# Patient Record
Sex: MALE | Employment: FULL TIME | URBAN - METROPOLITAN AREA
[De-identification: names, ages, dates, MRNs, and addresses within clinical notes are randomized per-mention and may not be internally consistent; named-entity substitution may affect disease eponyms.]

---

## 2017-02-08 ENCOUNTER — OFFICE VISIT (OUTPATIENT)
Dept: CARDIOLOGY | Facility: CLINIC | Age: 62
End: 2017-02-08

## 2017-02-08 VITALS
SYSTOLIC BLOOD PRESSURE: 114 MMHG | HEART RATE: 62 BPM | DIASTOLIC BLOOD PRESSURE: 78 MMHG | HEIGHT: 74 IN | WEIGHT: 255 LBS | BODY MASS INDEX: 32.73 KG/M2

## 2017-02-08 DIAGNOSIS — R26.89 IMBALANCE: ICD-10-CM

## 2017-02-08 DIAGNOSIS — I48.91 ATRIAL FIBRILLATION, UNSPECIFIED TYPE (HCC): Primary | ICD-10-CM

## 2017-02-08 DIAGNOSIS — I48.0 PAROXYSMAL ATRIAL FIBRILLATION (HCC): ICD-10-CM

## 2017-02-08 DIAGNOSIS — R53.82 CHRONIC FATIGUE: ICD-10-CM

## 2017-02-08 PROCEDURE — 93000 ELECTROCARDIOGRAM COMPLETE: CPT | Performed by: INTERNAL MEDICINE

## 2017-02-08 PROCEDURE — 99204 OFFICE O/P NEW MOD 45 MIN: CPT | Performed by: INTERNAL MEDICINE

## 2017-02-08 RX ORDER — TADALAFIL 5 MG
TABLET ORAL
Refills: 3 | COMMUNITY
Start: 2017-01-27

## 2017-02-08 RX ORDER — ASPIRIN 81 MG/1
81 TABLET ORAL DAILY
COMMUNITY

## 2017-02-08 RX ORDER — MELOXICAM 15 MG/1
15 TABLET ORAL DAILY
COMMUNITY

## 2017-02-08 RX ORDER — DOXAZOSIN MESYLATE 4 MG/1
4 TABLET ORAL NIGHTLY
COMMUNITY

## 2017-02-08 RX ORDER — RIVAROXABAN 20 MG/1
TABLET, FILM COATED ORAL
Refills: 0 | COMMUNITY
Start: 2017-01-31

## 2017-02-08 RX ORDER — ASCORBIC ACID 500 MG
1000 TABLET ORAL DAILY
COMMUNITY

## 2017-02-08 RX ORDER — AMIODARONE HYDROCHLORIDE 200 MG/1
TABLET ORAL
Refills: 2 | COMMUNITY
Start: 2017-01-31

## 2017-02-08 RX ORDER — LISINOPRIL 2.5 MG/1
TABLET ORAL
Refills: 2 | COMMUNITY
Start: 2017-01-31

## 2017-02-08 RX ORDER — OXYBUTYNIN CHLORIDE 5 MG/1
5 TABLET ORAL 3 TIMES DAILY
COMMUNITY

## 2017-02-08 NOTE — PROGRESS NOTES
[facility]  Cardiology Office Visit Note        Subjective:     Encounter Date:2017      Patient ID: Mahesh Abad   Age: 61 y.o.  Sex: male  :  1955  MRN: [id number]             Date of Office Visit: 2017  Encounter Provider: Isidro Ken MD  Place of Service: [facility]     .    Chief Complaint:  History of Present Illness    The following portions of the patient's history were reviewed and updated as appropriate: allergies, current medications, past family history, past medical history, past social history, past surgical history and problem list.    Review of Systems   Constitution: Positive for weakness, malaise/fatigue and weight gain.   HENT: Negative for congestion, headaches, hearing loss and sore throat.    Eyes: Negative for blurred vision.   Cardiovascular: Negative for chest pain, claudication, cyanosis, dyspnea on exertion, irregular heartbeat, leg swelling, near-syncope, orthopnea, palpitations, paroxysmal nocturnal dyspnea and syncope.   Respiratory: Negative for cough, shortness of breath and snoring.    Endocrine: Negative for cold intolerance.   Hematologic/Lymphatic: Negative for adenopathy. Does not bruise/bleed easily.   Skin: Negative for rash.   Musculoskeletal: Negative for back pain.   Gastrointestinal: Negative for abdominal pain, change in bowel habit, constipation and diarrhea.   Genitourinary: Negative for dysuria, frequency, hematuria and hesitancy.   Neurological: Positive for disturbances in coordination and loss of balance. Negative for excessive daytime sleepiness, dizziness and focal weakness.   Psychiatric/Behavioral: Negative for memory loss. The patient is not nervous/anxious.    Allergic/Immunologic: Negative for hives.   All other systems reviewed and are negative.        ECG 12 Lead  Date/Time: 2017 1:03 PM  Performed by: ISIDRO KEN JR  Authorized by: ISIDRO KEN JR    Comparison: not compared with previous ECG   Previous ECG: no previous ECG available  Rhythm: sinus bradycardia  BPM: 45  Conduction comments: Left axis deviation  Other findings: PRWP  Clinical impression: low voltage                       HPI     The patient is a 61-year-old white male from [facility] with history of new onset atrial fibrillation in late December 2016 now on amiodarone and Xarelto, who presents for a second opinion on how to treat his paroxysmal atrial fibrillation.  He ceased at 4 several weeks, he felt very fatigued and off balance.  No chest pain dizziness shortness of breath or palpitations.  No syncope.  Went to his doctor because of the fatigue and was diagnosed with atrial fibrillation apparently.  He was admitted to [facility] in December 30, 2016 and stated there until January 3, 2017.  During that admission, he underwent cardioversion without effect.  However, after treadmill test, his heart went back into sinus rhythm and he felt much better immediately.  At that point he was put on amiodarone which she continues to take.  This stress test however was abnormal.  The schedule him for an outpatient cardiac catheterization which will occur next week.  He also saw an electrophysiologist during that admission who talked him a lot ablation.       since discharge from [facility], he's had 4 episodes of what he thinks his atrial fibrillation because he will become weak and tired.  3 of the episodes lasted around 4 hours long, but one episode lasted 12 hours long.    Cardiac review of systems: No chest pain.  No PND, orthopnea or pedal edema.  No recent change in weight.  He quit smoking when he was admitted to [facility].  He is not keen weight after having stopped smoking.    He plays golf and walks for exercise.    He had an echocardiogram and was told that his ejection fraction was 45%.  I do not have records of that [facility] admission.    Cardiac risk factors: No  "diabetes, hypertension or hyperlipidemia.  No family history of early CAD as father had a stent at age 75.  He does not smoke since January of this year          No past medical history on file.    Past Surgical History   Procedure Laterality Date   • Gallbladder surgery     • Tonsilectomy, adenoidectomy, bilateral myringotomy and tubes         Social History     Social History   • Marital status:      Spouse name: N/A   • Number of children: N/A   • Years of education: N/A     Occupational History   • Not on file.     Social History Main Topics   • Smoking status: Former Smoker   • Smokeless tobacco: Not on file   • Alcohol use Yes   • Drug use: Not on file   • Sexual activity: Not on file     Other Topics Concern   • Not on file     Social History Narrative   • No narrative on file       Family History   Problem Relation Age of Onset   • Heart disease Father            Scheduled Meds:  No current outpatient prescriptions on file prior to visit.     No current facility-administered medications on file prior to visit.        Visit Vitals   • /78   • Pulse 62   • Ht 74\" (188 cm)   • Wt 255 lb (116 kg)   • BMI 32.74 kg/m2       Objective:     Physical Exam   Constitutional: He is oriented to person, place, and time. He appears well-developed and well-nourished. No distress.   HENT:   Head: Normocephalic and atraumatic.   Right Ear: External ear normal.   Left Ear: External ear normal.   Mouth/Throat: Oropharynx is clear and moist. No oropharyngeal exudate.   Eyes: Conjunctivae and EOM are normal. Pupils are equal, round, and reactive to light. No scleral icterus.   Neck: Normal range of motion. Neck supple. No JVD present. No tracheal deviation present. No thyromegaly present.   Cardiovascular: Regular rhythm, S1 normal, S2 normal, normal heart sounds and intact distal pulses.  Bradycardia present.  PMI is not displaced.  Exam reveals no gallop, no distant heart sounds, no friction rub and no decreased " pulses.    No murmur heard.  Pulmonary/Chest: Effort normal and breath sounds normal. No accessory muscle usage. No respiratory distress. He has no wheezes. He has no rales. He exhibits no tenderness.   Abdominal: Soft. Bowel sounds are normal. He exhibits no distension and no mass. There is no tenderness. There is no rebound and no guarding.   Musculoskeletal: Normal range of motion. He exhibits no edema, tenderness or deformity.   Lymphadenopathy:     He has no cervical adenopathy.   Neurological: He is alert and oriented to person, place, and time. He has normal reflexes. No cranial nerve deficit. Coordination normal.   Skin: Skin is dry. No rash noted. He is not diaphoretic. No erythema. No pallor.   Psychiatric: He has a normal mood and affect.             Lab Review:               Lab Review:         Lab Review     No results found for: CHOL  No results found for: HDL  No results found for: LDL  No results found for: TRIG  No components found for: CHOLHDL  No results found for: GLUCOSE, BUN, CREATININE, EGFRIFNONA, EGFRIFAFRI, BCR, CO2, CALCIUM, PROTENTOTREF, ALBUMIN, LABIL2, AST, ALT  No results found for: GLUCOSE, CALCIUM, NA, K, CO2, CL, BUN, CREATININE, EGFRIFAFRI, EGFRIFNONA, BCR, ANIONGAP  No results found for: WBC, HGB, HCT, MCV, PLT  No results found for: DDIMER  No results found for: TSH, S8ZQWMK, I6XHVJK, THYROIDAB  No results found for: CKTOTAL  No results found for: DIGOXIN  No results found for: CKTOTAL, CKMB, CKMBINDEX, TROPONINI, TROPONINT  No results found for: INR, PROTIME  CrCl cannot be calculated (Patient has no serum creatinine result on file.).    Assessment:          Diagnosis Plan   1. Atrial fibrillation, unspecified type  ECG 12 Lead   2. Paroxysmal atrial fibrillation     3. Chronic fatigue     4. Imbalance            Assessment and Plan:    Mahesh was seen today for atrial fibrillation.    Diagnoses and all orders for this visit:    Atrial fibrillation, unspecified type  -     ECG  12 Lead    Paroxysmal atrial fibrillation    Chronic fatigue    Imbalance    The patient is a 61-year-old male who presents for second opinion about treatment of his atrial fibrillation.  He is in sinus bradycardia right now rate 49 with PAC.  QT interval is 448 ms corrected.  He is on amiodarone 200 mg by mouth twice a day.  I will not change his dose as he is been loaded with amiodarone still.  I told him he needs to follow-up with his cardiologist [facility] that undergo cardiac catheterization.  The patient has no significant coronary artery disease then he could be switched to a different antiarrhythmic agent.  If he has coronary artery disease, then they can fix it and then later be switched to a another medication.  He is concerned about taking amiodarone long-term because of the side effects.  I told him that it is a good drug for now and stay on it.    His chads 2 score is 0.  I agree with Xarelto.  However, if he does not have prolonged episodes of atrial fibrillation over 24 hours, then could possibly come off the Xarelto.  Better yet, the patient may need to undergo ablation given that he is having breakthrough atrial fibrillation several times since discharge while on amiodarone, the best antiarrhythmic agent.  He will speak to his electrophysiologist about that.    He likely needs to be tested for sleep apnea because of his large body habitus.  Sleep apnea is a major risk factor for atrial fibrillation.      A total of 25 minutes was spent in the care of this patient, including at least 13 minutes face-to-face with the patient.    I not only counseled the patient today on the significant factors noted in the assessment and plan, but I also recommended that the patient reduce salt and saturated animal fat intake in diet, as well as to perform scheduled exercise on a regular basis.      Plan:                  02/08/2017  2:30 PM  MD Gabriel Gutierrez MD  2/8/2017, 2:30 PM    EMR  Dragon/Transcription disclaimer:   Much of this encounter note is an electronic transcription/translation of spoken language to printed text. The electronic translation of spoken language may permit erroneous, or at times, nonsensical words or phrases to be inadvertently transcribed; Although I have reviewed the note for such errors, some may still exist.

## 2018-01-25 ENCOUNTER — OFFICE VISIT CONVERTED (OUTPATIENT)
Dept: FAMILY MEDICINE CLINIC | Facility: CLINIC | Age: 63
End: 2018-01-25
Attending: FAMILY MEDICINE

## 2018-03-13 ENCOUNTER — OFFICE VISIT CONVERTED (OUTPATIENT)
Dept: FAMILY MEDICINE CLINIC | Facility: CLINIC | Age: 63
End: 2018-03-13
Attending: NURSE PRACTITIONER

## 2018-03-21 ENCOUNTER — OFFICE VISIT CONVERTED (OUTPATIENT)
Dept: PODIATRY | Facility: CLINIC | Age: 63
End: 2018-03-21
Attending: PODIATRIST

## 2018-08-01 ENCOUNTER — OFFICE VISIT CONVERTED (OUTPATIENT)
Dept: FAMILY MEDICINE CLINIC | Facility: CLINIC | Age: 63
End: 2018-08-01
Attending: NURSE PRACTITIONER

## 2021-05-09 VITALS
WEIGHT: 263 LBS | HEART RATE: 111 BPM | SYSTOLIC BLOOD PRESSURE: 120 MMHG | DIASTOLIC BLOOD PRESSURE: 80 MMHG | TEMPERATURE: 97 F | OXYGEN SATURATION: 96 % | BODY MASS INDEX: 33.77 KG/M2

## 2021-05-09 VITALS
HEIGHT: 74 IN | OXYGEN SATURATION: 95 % | DIASTOLIC BLOOD PRESSURE: 92 MMHG | HEART RATE: 104 BPM | WEIGHT: 263.25 LBS | BODY MASS INDEX: 33.79 KG/M2 | SYSTOLIC BLOOD PRESSURE: 152 MMHG | TEMPERATURE: 97.1 F

## 2021-05-09 VITALS
WEIGHT: 255.56 LBS | OXYGEN SATURATION: 97 % | SYSTOLIC BLOOD PRESSURE: 110 MMHG | HEART RATE: 118 BPM | DIASTOLIC BLOOD PRESSURE: 68 MMHG | TEMPERATURE: 97.3 F | BODY MASS INDEX: 32.81 KG/M2

## 2021-05-16 VITALS — HEART RATE: 67 BPM | BODY MASS INDEX: 34.91 KG/M2 | WEIGHT: 272 LBS | OXYGEN SATURATION: 97 % | HEIGHT: 74 IN
